# Patient Record
Sex: MALE | Race: OTHER
[De-identification: names, ages, dates, MRNs, and addresses within clinical notes are randomized per-mention and may not be internally consistent; named-entity substitution may affect disease eponyms.]

---

## 2021-09-27 PROBLEM — Z00.00 ENCOUNTER FOR PREVENTIVE HEALTH EXAMINATION: Status: ACTIVE | Noted: 2021-09-27

## 2021-11-30 ENCOUNTER — APPOINTMENT (OUTPATIENT)
Dept: ENDOCRINOLOGY | Facility: CLINIC | Age: 51
End: 2021-11-30
Payer: COMMERCIAL

## 2021-11-30 VITALS
OXYGEN SATURATION: 98 % | WEIGHT: 160 LBS | TEMPERATURE: 97.5 F | BODY MASS INDEX: 24.25 KG/M2 | DIASTOLIC BLOOD PRESSURE: 82 MMHG | HEIGHT: 68 IN | HEART RATE: 102 BPM | SYSTOLIC BLOOD PRESSURE: 152 MMHG

## 2021-11-30 DIAGNOSIS — Z82.5 FAMILY HISTORY OF ASTHMA AND OTHER CHRONIC LOWER RESPIRATORY DISEASES: ICD-10-CM

## 2021-11-30 DIAGNOSIS — Z83.3 FAMILY HISTORY OF DIABETES MELLITUS: ICD-10-CM

## 2021-11-30 DIAGNOSIS — Z78.9 OTHER SPECIFIED HEALTH STATUS: ICD-10-CM

## 2021-11-30 DIAGNOSIS — Z87.891 PERSONAL HISTORY OF NICOTINE DEPENDENCE: ICD-10-CM

## 2021-11-30 PROCEDURE — 99204 OFFICE O/P NEW MOD 45 MIN: CPT

## 2021-11-30 RX ORDER — LISINOPRIL 10 MG/1
10 TABLET ORAL
Refills: 0 | Status: ACTIVE | COMMUNITY

## 2021-11-30 RX ORDER — ASPIRIN 81 MG
81 TABLET, DELAYED RELEASE (ENTERIC COATED) ORAL
Refills: 0 | Status: ACTIVE | COMMUNITY

## 2021-11-30 RX ORDER — INSULIN GLARGINE 100 [IU]/ML
100 INJECTION, SOLUTION SUBCUTANEOUS
Refills: 0 | Status: ACTIVE | COMMUNITY

## 2021-11-30 RX ORDER — METFORMIN HYDROCHLORIDE 1000 MG/1
1000 TABLET, COATED ORAL
Refills: 0 | Status: ACTIVE | COMMUNITY

## 2021-11-30 RX ORDER — SIMVASTATIN 40 MG/1
40 TABLET, FILM COATED ORAL
Refills: 0 | Status: ACTIVE | COMMUNITY

## 2021-11-30 NOTE — REASON FOR VISIT
[Initial Evaluation] : an initial evaluation [DM Type 2] : DM Type 2 [Weight Management/Obesity] : weight management/obesity [FreeTextEntry2] : Dr Haq

## 2021-11-30 NOTE — HISTORY OF PRESENT ILLNESS
[FreeTextEntry1] : Mr. GALE BIGGS  Is  a 51 year  old male  who presented for evaluation of type 2 Diabetes:\par \par \par Diagnosis: around 10 years ago \par Current Regimen:  Lantus 50 units Bedtime, metformin 1000 mg BID, januvia 50 mg daily ( added in 9/2021) \par Previous regimens: only above \par Compliance: good \par SMBG/CGM :  am fasting 110-220 \par Hypoglycemia: none \par Polyuria/polydipsia : no \par Weight change/BMI: lost\par Diet: sometimes skip lunch \par Exercise: not often \par HBa1c trend: 11.3% ( 9/2021) \par \par \par .prevention  \par Statin: simvastatin 40 mg \par ACE/ARB :lisinopril 10 mg daily \par Eye examination: no retinopathy \par Neuropathy: no , no podiatry \par NO CAD , no STROKE\par + FH of type 1 DM

## 2021-11-30 NOTE — CONSULT LETTER
[Dear  ___] : Dear  [unfilled], [( Thank you for referring [unfilled] for consultation for _____ )] : Thank you for referring [unfilled] for consultation for [unfilled] [Please see my note below.] : Please see my note below. [Consult Closing:] : Thank you very much for allowing me to participate in the care of this patient.  If you have any questions, please do not hesitate to contact me. [Sincerely,] : Sincerely, [FreeTextEntry3] : Marlena Morgan MD

## 2021-11-30 NOTE — REVIEW OF SYSTEMS
[Fatigue] : no fatigue [Recent Weight Gain (___ Lbs)] : no recent weight gain [Blurred Vision] : no blurred vision [Dysphagia] : no dysphagia [Neck Pain] : no neck pain [Chest Pain] : no chest pain [Palpitations] : no palpitations [Fast Heart Rate] : heart rate is not fast [Lower Ext Edema] : no lower extremity edema [Shortness Of Breath] : no shortness of breath [Wheezing] : no wheezing [SOB on Exertion] : no shortness of breath on exertion [Nausea] : no nausea [Constipation] : no constipation [Vomiting] : no vomiting [Diarrhea] : no diarrhea [Headaches] : no headaches [Dizziness] : no dizziness [Tremors] : no tremors [Pain/Numbness of Digits] : no pain/numbness of digits [Cold Intolerance] : no cold intolerance [Heat Intolerance] : no heat intolerance

## 2021-11-30 NOTE — PHYSICAL EXAM
[Alert] : alert [Well Nourished] : well nourished [Healthy Appearance] : healthy appearance [No Acute Distress] : no acute distress [No Proptosis] : no proptosis [No Lid Lag] : no lid lag [Thyroid Not Enlarged] : the thyroid was not enlarged [No Thyroid Nodules] : no palpable thyroid nodules [No Respiratory Distress] : no respiratory distress [No Accessory Muscle Use] : no accessory muscle use [Clear to Auscultation] : lungs were clear to auscultation bilaterally [Normal S1, S2] : normal S1 and S2 [No Murmurs] : no murmurs [Regular Rhythm] : with a regular rhythm [No Edema] : no peripheral edema [No Stigmata of Cushings Syndrome] : no stigmata of Cushings Syndrome [Acanthosis Nigricans] : acanthosis nigricans present [No Tremors] : no tremors [Abdominal Striae] : no abdominal striae

## 2021-11-30 NOTE — ASSESSMENT
[Diabetes Foot Care] : diabetes foot care [Carbohydrate Consistent Diet] : carbohydrate consistent diet [Importance of Diet and Exercise] : importance of diet and exercise to improve glycemic control, achieve weight loss and improve cardiovascular health [Exercise/Effect on Glucose] : exercise/effect on glucose [Hypoglycemia Management] : hypoglycemia management [Self Monitoring of Blood Glucose] : self monitoring of blood glucose [Retinopathy Screening] : Patient was referred to ophthalmology for retinopathy screening [Weight Loss] : weight loss [FreeTextEntry1] : Mr. GALE BIGGS  Is  a 51 year  old male  who presented for evaluation of type 2 Diabetes:\par \par #uncontrolled type 2 DM : \par - A1c 11.3% , SMBG with above target numbers \par - COntinue Lanstu 50 units daily , metformin 1000 mg BID \par - STOP januvia \par - start Trulicity 0.75 mg Q week, reviewed SE and benefits \par - will check antibodies and C peptid , + FH of type 1 ? [place] \par - COntinue statin and ACE inhibitors for now \par - following with opthalmology \par - podiatry and DM educator referral

## 2021-12-15 ENCOUNTER — TRANSCRIPTION ENCOUNTER (OUTPATIENT)
Age: 51
End: 2021-12-15

## 2022-02-08 ENCOUNTER — APPOINTMENT (OUTPATIENT)
Dept: ENDOCRINOLOGY | Facility: CLINIC | Age: 52
End: 2022-02-08
Payer: COMMERCIAL

## 2022-02-08 VITALS
BODY MASS INDEX: 24.1 KG/M2 | WEIGHT: 159 LBS | OXYGEN SATURATION: 98 % | TEMPERATURE: 97.4 F | HEIGHT: 68 IN | DIASTOLIC BLOOD PRESSURE: 80 MMHG | HEART RATE: 90 BPM | SYSTOLIC BLOOD PRESSURE: 124 MMHG

## 2022-02-08 DIAGNOSIS — E10.65 TYPE 1 DIABETES MELLITUS WITH HYPERGLYCEMIA: ICD-10-CM

## 2022-02-08 DIAGNOSIS — E11.65 TYPE 2 DIABETES MELLITUS WITH HYPERGLYCEMIA: ICD-10-CM

## 2022-02-08 PROCEDURE — 99214 OFFICE O/P EST MOD 30 MIN: CPT

## 2022-02-08 RX ORDER — FLASH GLUCOSE SCANNING READER
EACH MISCELLANEOUS
Qty: 1 | Refills: 0 | Status: ACTIVE | COMMUNITY
Start: 2022-02-08 | End: 1900-01-01

## 2022-02-08 NOTE — CONSULT LETTER
[Dear  ___] : Dear  [unfilled], [Courtesy Letter:] : I had the pleasure of seeing your patient, [unfilled], in my office today. [Please see my note below.] : Please see my note below. [Referral Closing:] : Thank you very much for seeing this patient.  If you have any questions, please do not hesitate to contact me. [Sincerely,] : Sincerely, [FreeTextEntry3] : Marlena Morgan

## 2022-02-08 NOTE — PHYSICAL EXAM
[Alert] : alert [Well Nourished] : well nourished [Healthy Appearance] : healthy appearance [No Acute Distress] : no acute distress [No Proptosis] : no proptosis [No Lid Lag] : no lid lag [Thyroid Not Enlarged] : the thyroid was not enlarged [No Thyroid Nodules] : no palpable thyroid nodules [No Respiratory Distress] : no respiratory distress [No Accessory Muscle Use] : no accessory muscle use [No Murmurs] : no murmurs [Regular Rhythm] : with a regular rhythm [No Edema] : no peripheral edema [No Stigmata of Cushings Syndrome] : no stigmata of Cushings Syndrome [Acanthosis Nigricans] : acanthosis nigricans present [No Tremors] : no tremors [Abdominal Striae] : no abdominal striae

## 2022-02-08 NOTE — DATA REVIEWED
[FreeTextEntry1] : 9/2021: A1c 11.3% glucose 178 crea 0.72 AST 13 ALT 13    \par 2/1/22: A1c 11.8% glucose 169 DON antibodies positive (30)  c peptid  LDL 94 crea 0.79  TSH 2.72\par

## 2022-02-08 NOTE — HISTORY OF PRESENT ILLNESS
[FreeTextEntry1] : Mr. GALE BIGGS  Is  a 51 year  old male (female )   who present for follow up Type 1 DM /[place] \par \par \par Diagnosis: around 10 years ago with type 2 Diabetes , antibodies checked now and is positive ([place]) \par Current Regimen:  Lantus 50 units Bedtime, metformin 1000 mg BID, trulicity 0.75 mg Q week  \par Previous regimens: only above \par Compliance: good \par SMBG/CGM :  noticed improved numbers on trulicity but not at target \par Hypoglycemia: none \par Polyuria/polydipsia : no \par Weight change/BMI: lost\par Diet: sometimes skip lunch \par Exercise: not often \par HBa1c trend: 11.3% ( 9/2021) ..11.8% (2/2022) \par DON positive \par \par \par .prevention  \par Statin: simvastatin 40 mg \par ACE/ARB :lisinopril 10 mg daily \par Eye examination: no retinopathy \par Neuropathy: no , no podiatry \par NO CAD , no STROKE\par + FH of type 1 DM

## 2022-02-08 NOTE — ASSESSMENT
[Diabetes Foot Care] : diabetes foot care [Carbohydrate Consistent Diet] : carbohydrate consistent diet [Importance of Diet and Exercise] : importance of diet and exercise to improve glycemic control, achieve weight loss and improve cardiovascular health [Exercise/Effect on Glucose] : exercise/effect on glucose [Hypoglycemia Management] : hypoglycemia management [Self Monitoring of Blood Glucose] : self monitoring of blood glucose [Retinopathy Screening] : Patient was referred to ophthalmology for retinopathy screening [Weight Loss] : weight loss [FreeTextEntry1] : GALE BIGGS  Is  a 51 year  old male (female) who presented for follow up, found to have positive antibodies for DON and now diagnosed as [place] / type 1 DM \par \par #uncontrolled type I DM/ [place]  \par - A1c 11.3% , was managed as type 2 DM , now antibodies positive very low c peptid indicating [place] /type 1 DM \par - STOP trulicity \par - Stop metformin \par - Continue Lantus 50 units daily\par - start Admelog 6 units TIDAC + ISS 2:50 \par - will need CGM as will be checking FS 4-5 times/day and adjusting insulin doses ( 4 injections/day )  based on FS \par - reviewed freestyle sruthi use and insertion \par - refer to DM educator to learn carb counting and correction \par - COntinue statin and ACE inhibitors for now \par - following with opthalmology \par

## 2022-02-08 NOTE — REASON FOR VISIT
[Follow - Up] : a follow-up visit [Weight Management/Obesity] : weight management/obesity [DM Type 1] : DM Type 1 [FreeTextEntry2] : Dr Haq

## 2022-02-22 ENCOUNTER — OUTPATIENT (OUTPATIENT)
Dept: OUTPATIENT SERVICES | Facility: HOSPITAL | Age: 52
LOS: 1 days | Discharge: HOME | End: 2022-02-22

## 2022-02-22 ENCOUNTER — NON-APPOINTMENT (OUTPATIENT)
Age: 52
End: 2022-02-22

## 2022-02-22 ENCOUNTER — APPOINTMENT (OUTPATIENT)
Dept: NUTRITION | Facility: CLINIC | Age: 52
End: 2022-02-22

## 2022-03-08 DIAGNOSIS — E10.9 TYPE 1 DIABETES MELLITUS WITHOUT COMPLICATIONS: ICD-10-CM

## 2022-03-21 ENCOUNTER — NON-APPOINTMENT (OUTPATIENT)
Age: 52
End: 2022-03-21

## 2022-03-22 ENCOUNTER — APPOINTMENT (OUTPATIENT)
Dept: NUTRITION | Facility: CLINIC | Age: 52
End: 2022-03-22

## 2022-04-19 ENCOUNTER — OUTPATIENT (OUTPATIENT)
Dept: OUTPATIENT SERVICES | Facility: HOSPITAL | Age: 52
LOS: 1 days | Discharge: HOME | End: 2022-04-19

## 2022-04-19 ENCOUNTER — NON-APPOINTMENT (OUTPATIENT)
Age: 52
End: 2022-04-19

## 2022-04-19 ENCOUNTER — APPOINTMENT (OUTPATIENT)
Dept: NUTRITION | Facility: CLINIC | Age: 52
End: 2022-04-19

## 2022-04-27 DIAGNOSIS — E10.9 TYPE 1 DIABETES MELLITUS WITHOUT COMPLICATIONS: ICD-10-CM

## 2022-05-09 ENCOUNTER — APPOINTMENT (OUTPATIENT)
Dept: ENDOCRINOLOGY | Facility: CLINIC | Age: 52
End: 2022-05-09
Payer: COMMERCIAL

## 2022-05-09 VITALS
BODY MASS INDEX: 26.37 KG/M2 | HEIGHT: 68 IN | OXYGEN SATURATION: 98 % | SYSTOLIC BLOOD PRESSURE: 124 MMHG | HEART RATE: 80 BPM | TEMPERATURE: 97.2 F | WEIGHT: 174 LBS | DIASTOLIC BLOOD PRESSURE: 78 MMHG

## 2022-05-09 PROCEDURE — 99214 OFFICE O/P EST MOD 30 MIN: CPT

## 2022-05-09 RX ORDER — INSULIN LISPRO 100 U/ML
100 INJECTION, SOLUTION SUBCUTANEOUS
Qty: 1 | Refills: 3 | Status: DISCONTINUED | COMMUNITY
Start: 2022-02-08 | End: 2022-05-09

## 2022-05-09 NOTE — REASON FOR VISIT
[Follow - Up] : a follow-up visit [DM Type 1] : DM Type 1 [Weight Management/Obesity] : weight management/obesity [FreeTextEntry2] : Dr Haq

## 2022-05-09 NOTE — ASSESSMENT
[Diabetes Foot Care] : diabetes foot care [Carbohydrate Consistent Diet] : carbohydrate consistent diet [Importance of Diet and Exercise] : importance of diet and exercise to improve glycemic control, achieve weight loss and improve cardiovascular health [Exercise/Effect on Glucose] : exercise/effect on glucose [Hypoglycemia Management] : hypoglycemia management [Self Monitoring of Blood Glucose] : self monitoring of blood glucose [Retinopathy Screening] : Patient was referred to ophthalmology for retinopathy screening [Weight Loss] : weight loss [FreeTextEntry1] : GALE BIGGS  Is  a 51 year  old male (female) who presented for follow up, found to have positive antibodies for DON and now diagnosed as [place] / type 1 DM with features of type 2 DM \par \par #uncontrolled type I DM/ [place]  \par - A1c 11.3% now down to 9.3%  , was managed as type 2 DM , now antibodies positive very low c peptid indicating [place] /type 1 DM \par - CGM reviewed showing hyperglycemia , , rare hypoglycemia always lunch time (2-4 pm ) related to taking insulin and not eating . advised can wait until eating to take insulin \par  - increase Semglee to 60 units , and Humalog to 10+ ISS 2:50 > 150 \par - restart metformin 1000 mg BID , given weight gain , if no improvement will restart  trulicity too to help with weight as patient has also features of type 2 Dm \par - using FreeStyle sruthi  CGM as will be checking FS 4-5 times/day and adjusting insulin doses ( 4 injections/day )  based on FS \par - COntinue statin and ACE inhibitors for now \par - following with opthalmology and podiatry \par \par

## 2022-05-09 NOTE — REVIEW OF SYSTEMS
[Recent Weight Gain (___ Lbs)] : recent weight gain: [unfilled] lbs [Fatigue] : no fatigue [Blurred Vision] : no blurred vision [Dysphagia] : no dysphagia [Neck Pain] : no neck pain [Chest Pain] : no chest pain [Palpitations] : no palpitations [Fast Heart Rate] : heart rate is not fast [Lower Ext Edema] : no lower extremity edema [Shortness Of Breath] : no shortness of breath [Wheezing] : no wheezing [SOB on Exertion] : no shortness of breath on exertion [Nausea] : no nausea [Constipation] : no constipation [Vomiting] : no vomiting [Diarrhea] : no diarrhea [Headaches] : no headaches [Dizziness] : no dizziness [Tremors] : no tremors [Pain/Numbness of Digits] : no pain/numbness of digits [Cold Intolerance] : no cold intolerance [Heat Intolerance] : no heat intolerance

## 2022-05-09 NOTE — HISTORY OF PRESENT ILLNESS
[George] : George [Hypoglycemia] : Patient is hypoglycemic. [FreeTextEntry1] : Mr. GALE BIGGS  Is  a 51 year  old male (female )   who present for follow up Type 1 DM /[place] \par \par \par Diagnosis: around 10 years ago with type 2 Diabetes , antibodies checked now and is positive ([place]) has also features of type 2 DM \par Current Regimen:  Semglee 50 units daily at bedtime, Humalog 6 + ISs 2:50 TIDAC \par Previous regimens: metformin and Trulicity \par Compliance: good \par SMBG/CGM : using FreeStyle sruthi  noticed improved numbers but still high \par Hypoglycemia: few episodes related to taking insulin and not eating on time \par Polyuria/polydipsia : no \par Weight change/BMI: gained since started insulin \par Diet: sometimes skip lunch \par Exercise: not often \par HBa1c trend: 11.3% ( 9/2021) ..11.8% (2/2022)...9.3% ( 5/2022) \par DON positive \par \par \par .prevention  \par Statin: simvastatin 40 mg \par ACE/ARB :lisinopril 10 mg daily \par Eye examination: no retinopathy \par Neuropathy: no , no podiatry \par NO CAD , no STROKE\par + FH of type 1 DM   [FreeTextEntry2] : 18 [FreeTextEntry3] : 30 very high 51 [FreeTextEntry4] : 1%

## 2022-05-09 NOTE — DATA REVIEWED
[FreeTextEntry1] : 9/2021: A1c 11.3% glucose 178 crea 0.72 AST 13 ALT 13    \par 2/1/22: A1c 11.8% glucose 169 DON antibodies positive (30)  c peptid  LDL 94 crea 0.79  TSH 2.72\par 5/3/22: A1c 9.3% glucose 261 LDL 89 TG 89 crea 0.84   AST 14  ALT 17  TSH 2.74  hb 15.1

## 2022-05-27 ENCOUNTER — RX RENEWAL (OUTPATIENT)
Age: 52
End: 2022-05-27

## 2022-07-05 ENCOUNTER — APPOINTMENT (OUTPATIENT)
Dept: NUTRITION | Facility: CLINIC | Age: 52
End: 2022-07-05

## 2022-08-08 ENCOUNTER — APPOINTMENT (OUTPATIENT)
Dept: ENDOCRINOLOGY | Facility: CLINIC | Age: 52
End: 2022-08-08

## 2022-08-08 VITALS
DIASTOLIC BLOOD PRESSURE: 78 MMHG | TEMPERATURE: 97.2 F | BODY MASS INDEX: 26.83 KG/M2 | HEIGHT: 68 IN | HEART RATE: 82 BPM | OXYGEN SATURATION: 98 % | SYSTOLIC BLOOD PRESSURE: 122 MMHG | WEIGHT: 177 LBS

## 2022-08-08 PROCEDURE — 99214 OFFICE O/P EST MOD 30 MIN: CPT

## 2022-08-08 NOTE — ASSESSMENT
[Diabetes Foot Care] : diabetes foot care [Carbohydrate Consistent Diet] : carbohydrate consistent diet [Importance of Diet and Exercise] : importance of diet and exercise to improve glycemic control, achieve weight loss and improve cardiovascular health [Exercise/Effect on Glucose] : exercise/effect on glucose [Hypoglycemia Management] : hypoglycemia management [Self Monitoring of Blood Glucose] : self monitoring of blood glucose [Retinopathy Screening] : Patient was referred to ophthalmology for retinopathy screening [Weight Loss] : weight loss [FreeTextEntry1] : GALE BIGGS  Is  a 52 year  old male (female) who presented for follow up, found to have positive antibodies for DON and now diagnosed as [place] / type 1 DM with features of type 2 DM \par \par #uncontrolled type I DM/ [place]  \par - A1c 11.3% now down to 7.3%   , was managed as type 2 DM , now antibodies positive very low c peptid indicating [place] /type 1 DM \par - CGM reviewed showing hyperglycemia more over past 2 weeks as was on vacation \par  - continue Semglee  60 units at bedtime and add am dose 10 units  , and Humalog to 10+ ISS 2:50 > 150 with breakfast and lunch and 12+ iSS with dinner \par - continue metformin 1000 mg BID , given weight gain , and restart  trulicity too to help with weight as patient has also features of type 2 Dm \par - using FreeStyle sruthi  CGM as will be checking FS 4-5 times/day and adjusting insulin doses ( 4 injections/day )  based on FS \par - COntinue statin and ACE inhibitors for now \par - following with opthalmology and podiatry \par - b12 low start OTC \par

## 2022-08-08 NOTE — HISTORY OF PRESENT ILLNESS
[George] : George [Hypoglycemia] : Patient is hypoglycemic. [FreeTextEntry1] : Mr. GALE BIGGS  Is  a 52 year  old male (female )   who present for follow up Type 1 DM /[place] with insulin resistance \par \par \par Diagnosis: around 10 years ago with type 2 Diabetes , antibodies checked now and is positive ([place]) has also features of type 2 DM \par Current Regimen:  Semglee 60 units daily at bedtime, Humalog 10 + ISs 2:50 TIDAC , metformin 1000 mg BID \par Previous regimens:  Trulicity \par Compliance: good \par SMBG/CGM : using FreeStyle sruthi  noticed improved numbers but still high \par Hypoglycemia: only one\par Polyuria/polydipsia : no \par Weight change/BMI: gained since started insulin\par Diet: sometimes skip lunch \par Exercise: not often \par HBa1c trend: 11.3% ( 9/2021) ..11.8% (2/2022)...9.3% ( 5/2022) ...7.6% ( 8/2022) \par DON positive \par \par \par .prevention  \par Statin: simvastatin 40 mg \par ACE/ARB :lisinopril 10 mg daily \par Eye examination: no retinopathy \par Neuropathy: no , no podiatry \par NO CAD , no STROKE\par + FH of type 1 DM   [FreeTextEntry3] : very high 51

## 2022-08-08 NOTE — DATA REVIEWED
[FreeTextEntry1] : 9/2021: A1c 11.3% glucose 178 crea 0.72 AST 13 ALT 13    \par 2/1/22: A1c 11.8% glucose 169 DON antibodies positive (30)  c peptid  LDL 94 crea 0.79  TSH 2.72\par 5/3/22: A1c 9.3% glucose 261 LDL 89 TG 89 crea 0.84   AST 14  ALT 17  TSH 2.74  hb 15.1 \par 8/1/22: A1c 7.6%  glucose 153   Crea 0.96  GFR 95 LDL 92   TSH 3.4 b12 280

## 2022-11-17 ENCOUNTER — RX RENEWAL (OUTPATIENT)
Age: 52
End: 2022-11-17

## 2023-01-08 ENCOUNTER — RX RENEWAL (OUTPATIENT)
Age: 53
End: 2023-01-08

## 2023-03-01 ENCOUNTER — RX RENEWAL (OUTPATIENT)
Age: 53
End: 2023-03-01

## 2023-03-02 ENCOUNTER — APPOINTMENT (OUTPATIENT)
Dept: ENDOCRINOLOGY | Facility: CLINIC | Age: 53
End: 2023-03-02
Payer: COMMERCIAL

## 2023-03-02 VITALS
OXYGEN SATURATION: 98 % | WEIGHT: 185 LBS | DIASTOLIC BLOOD PRESSURE: 82 MMHG | SYSTOLIC BLOOD PRESSURE: 150 MMHG | BODY MASS INDEX: 28.04 KG/M2 | HEIGHT: 68 IN | HEART RATE: 85 BPM | TEMPERATURE: 97.4 F

## 2023-03-02 PROCEDURE — 99214 OFFICE O/P EST MOD 30 MIN: CPT

## 2023-03-02 RX ORDER — DULAGLUTIDE 0.75 MG/.5ML
0.75 INJECTION, SOLUTION SUBCUTANEOUS
Qty: 3 | Refills: 2 | Status: DISCONTINUED | COMMUNITY
Start: 2022-08-08 | End: 2023-03-02

## 2023-03-02 RX ORDER — DULAGLUTIDE 0.75 MG/.5ML
0.75 INJECTION, SOLUTION SUBCUTANEOUS
Qty: 3 | Refills: 1 | Status: DISCONTINUED | COMMUNITY
Start: 2021-11-30 | End: 2023-03-02

## 2023-03-02 RX ORDER — INSULIN GLARGINE 100 [IU]/ML
100 INJECTION, SOLUTION SUBCUTANEOUS DAILY
Qty: 4 | Refills: 1 | Status: DISCONTINUED | COMMUNITY
Start: 2022-03-28 | End: 2023-03-02

## 2023-03-02 NOTE — ASSESSMENT
[Diabetes Foot Care] : diabetes foot care [Carbohydrate Consistent Diet] : carbohydrate consistent diet [Importance of Diet and Exercise] : importance of diet and exercise to improve glycemic control, achieve weight loss and improve cardiovascular health [Exercise/Effect on Glucose] : exercise/effect on glucose [Hypoglycemia Management] : hypoglycemia management [Self Monitoring of Blood Glucose] : self monitoring of blood glucose [Retinopathy Screening] : Patient was referred to ophthalmology for retinopathy screening [Weight Loss] : weight loss [FreeTextEntry1] : GALE BIGGS  Is  a 52 year  old patient  who presented for follow up, found to have positive antibodies for DON and  diagnosed as [place] / type 1 DM with features of type 2 DM \par \par #uncontrolled type I DM/ [place]  with insulin resistance \par - A1c now down to 7.7%   , was managed as type 2 DM , now antibodies positive very low c peptid indicating [place] /type 1 DM \par - CGM reviewed showing hyperglycemia mostly night time  and post meals \par - will switch trulicity to MOUNJARO 5 mg Q week , as patient as inuslin resistance too and not loosing weight  \par  - continue Semglee  60 units at bedtime and   increase  Humalog to 12 + ISS 2: 50 > 120 with breakfast and lunch and  dinner \par - if no improvement with MOunjaro will add Semlgee in am 10-20 units \par - continue metformin 1000 mg BID\par - using FreeStyle sruthi  CGM as will be checking FS 4-5 times/day and adjusting insulin doses ( 4 injections/day )  based on FS \par - COntinue statin and ACE inhibitors for now , BP rechecked 135/80 \par - following with opthalmology and podiatry \par - b12 low was taking 5000 mg daily now high , stop and will monitor \par \par # vit d deficiency : start OTC 1000 IU daily \par

## 2023-03-02 NOTE — DATA REVIEWED
[FreeTextEntry1] : 9/2021: A1c 11.3% glucose 178 crea 0.72 AST 13 ALT 13    \par 2/1/22: A1c 11.8% glucose 169 DON antibodies positive (30)  c peptid  LDL 94 crea 0.79  TSH 2.72\par 5/3/22: A1c 9.3% glucose 261 LDL 89 TG 89 crea 0.84   AST 14  ALT 17  TSH 2.74  hb 15.1 \par 8/1/22: A1c 7.6%  glucose 153   Crea 0.96  GFR 95 LDL 92   TSH 3.4 b12 280 \par 2/2023: A1c 7.7%   glucose 110    crea 0.85   K 5.4  ACR negative   TSH 3.16  b12 > 2000

## 2023-03-02 NOTE — HISTORY OF PRESENT ILLNESS
[George] : George [Hypoglycemia] : Patient is hypoglycemic. [FreeTextEntry1] : Mr. GALE BIGGS  Is  a 52 year  old male (female )   who present for follow up Type 1 DM /[place] with insulin resistance \par \par \par Diagnosis: around 10 years ago with type 2 Diabetes , antibodies checked nand is positive ([place]) has also features of type 2 DM \par Current Regimen:  Semglee 60 units daily at bedtime, Humalog 10 + ISS 2:50 TIDAC , metformin 1000 mg BID , trulicity 0.75 mg Q week \par Previous regimens:  as above \par Compliance: good \par SMBG/CGM : using FreeStyle sruthi  2 , remain higher\par Hypoglycemia: 1%\par Polyuria/polydipsia : no \par Weight change/BMI: gained since started insulin\par Diet: sometimes skip lunch \par Exercise: not often \par HBa1c trend: 11.3% ( 9/2021) ..11.8% (2/2022)...9.3% ( 5/2022) ...7.6% ( 8/2022) ...7.7% ( 2/2023) \par DON positive \par \par \par .prevention  \par Statin: simvastatin 40 mg \par ACE/ARB :lisinopril 10 mg daily \par Eye examination: no retinopathy \par Neuropathy: no , no podiatry \par NO CAD , no STROKE\par + FH of type 1 DM

## 2023-03-02 NOTE — PHYSICAL EXAM
[Alert] : alert [Well Nourished] : well nourished [Healthy Appearance] : healthy appearance [No Acute Distress] : no acute distress [No Proptosis] : no proptosis [No Lid Lag] : no lid lag [Thyroid Not Enlarged] : the thyroid was not enlarged [No Thyroid Nodules] : no palpable thyroid nodules [No Respiratory Distress] : no respiratory distress [No Accessory Muscle Use] : no accessory muscle use [No Murmurs] : no murmurs [Regular Rhythm] : with a regular rhythm [No Edema] : no peripheral edema [No Stigmata of Cushings Syndrome] : no stigmata of Cushings Syndrome [Acanthosis Nigricans] : acanthosis nigricans present [No Tremors] : no tremors [Abdominal Striae] : no abdominal striae [Oriented x3] : oriented to person, place, and time

## 2023-06-16 ENCOUNTER — RX RENEWAL (OUTPATIENT)
Age: 53
End: 2023-06-16

## 2023-07-20 ENCOUNTER — APPOINTMENT (OUTPATIENT)
Dept: ENDOCRINOLOGY | Facility: CLINIC | Age: 53
End: 2023-07-20

## 2023-09-13 ENCOUNTER — APPOINTMENT (OUTPATIENT)
Dept: ENDOCRINOLOGY | Facility: CLINIC | Age: 53
End: 2023-09-13
Payer: COMMERCIAL

## 2023-09-13 VITALS
DIASTOLIC BLOOD PRESSURE: 70 MMHG | WEIGHT: 177 LBS | OXYGEN SATURATION: 98 % | BODY MASS INDEX: 26.83 KG/M2 | SYSTOLIC BLOOD PRESSURE: 130 MMHG | HEART RATE: 80 BPM | HEIGHT: 68 IN

## 2023-09-13 DIAGNOSIS — E16.2 HYPOGLYCEMIA, UNSPECIFIED: ICD-10-CM

## 2023-09-13 DIAGNOSIS — E66.9 OBESITY, UNSPECIFIED: ICD-10-CM

## 2023-09-13 PROCEDURE — 99214 OFFICE O/P EST MOD 30 MIN: CPT

## 2023-09-13 RX ORDER — GLUCAGON 3 MG/1
3 POWDER NASAL
Qty: 1 | Refills: 3 | Status: ACTIVE | COMMUNITY
Start: 2023-09-13 | End: 1900-01-01

## 2023-09-13 RX ORDER — SITAGLIPTIN 50 MG/1
50 TABLET, FILM COATED ORAL
Refills: 0 | Status: DISCONTINUED | COMMUNITY
End: 2023-09-13

## 2023-09-14 ENCOUNTER — NON-APPOINTMENT (OUTPATIENT)
Age: 53
End: 2023-09-14

## 2024-03-13 ENCOUNTER — APPOINTMENT (OUTPATIENT)
Dept: ENDOCRINOLOGY | Facility: CLINIC | Age: 54
End: 2024-03-13
Payer: COMMERCIAL

## 2024-03-13 VITALS
HEIGHT: 68 IN | BODY MASS INDEX: 26.83 KG/M2 | DIASTOLIC BLOOD PRESSURE: 80 MMHG | WEIGHT: 177 LBS | SYSTOLIC BLOOD PRESSURE: 130 MMHG | OXYGEN SATURATION: 98 % | HEART RATE: 77 BPM

## 2024-03-13 DIAGNOSIS — R79.89 OTHER SPECIFIED ABNORMAL FINDINGS OF BLOOD CHEMISTRY: ICD-10-CM

## 2024-03-13 DIAGNOSIS — E13.9 OTHER SPECIFIED DIABETES MELLITUS W/OUT COMPLICATIONS: ICD-10-CM

## 2024-03-13 DIAGNOSIS — E78.5 HYPERLIPIDEMIA, UNSPECIFIED: ICD-10-CM

## 2024-03-13 PROCEDURE — 99214 OFFICE O/P EST MOD 30 MIN: CPT

## 2024-03-13 RX ORDER — PEN NEEDLE, DIABETIC 29 G X1/2"
31G X 5 MM NEEDLE, DISPOSABLE MISCELLANEOUS
Qty: 6 | Refills: 1 | Status: ACTIVE | COMMUNITY
Start: 2022-02-08 | End: 1900-01-01

## 2024-03-13 RX ORDER — TIRZEPATIDE 5 MG/.5ML
5 INJECTION, SOLUTION SUBCUTANEOUS
Qty: 3 | Refills: 1 | Status: ACTIVE | COMMUNITY
Start: 2024-03-13 | End: 1900-01-01

## 2024-03-13 RX ORDER — FLASH GLUCOSE SENSOR
KIT MISCELLANEOUS
Qty: 6 | Refills: 1 | Status: ACTIVE | COMMUNITY
Start: 2022-02-08 | End: 1900-01-01

## 2024-03-13 RX ORDER — METFORMIN HYDROCHLORIDE 1000 MG/1
1000 TABLET, COATED ORAL
Qty: 180 | Refills: 1 | Status: ACTIVE | COMMUNITY
Start: 2022-05-09 | End: 1900-01-01

## 2024-03-13 RX ORDER — DULAGLUTIDE 1.5 MG/.5ML
1.5 INJECTION, SOLUTION SUBCUTANEOUS
Qty: 3 | Refills: 1 | Status: DISCONTINUED | COMMUNITY
Start: 2023-03-02 | End: 2024-03-13

## 2024-03-13 RX ORDER — INSULIN LISPRO 100 [IU]/ML
100 INJECTION, SOLUTION INTRAVENOUS; SUBCUTANEOUS
Qty: 90 | Refills: 1 | Status: ACTIVE | COMMUNITY
Start: 2022-02-10 | End: 1900-01-01

## 2024-03-13 RX ORDER — INSULIN GLARGINE-YFGN 100 [IU]/ML
100 INJECTION, SOLUTION SUBCUTANEOUS
Qty: 6 | Refills: 3 | Status: DISCONTINUED | COMMUNITY
Start: 2023-03-02 | End: 2024-03-13

## 2024-03-13 RX ORDER — TIRZEPATIDE 5 MG/.5ML
5 INJECTION, SOLUTION SUBCUTANEOUS
Qty: 3 | Refills: 1 | Status: DISCONTINUED | COMMUNITY
Start: 2023-03-02 | End: 2024-03-13

## 2024-03-13 RX ORDER — INSULIN GLARGINE-YFGN 100 [IU]/ML
100 INJECTION, SOLUTION SUBCUTANEOUS DAILY
Qty: 4 | Refills: 2 | Status: ACTIVE | COMMUNITY
Start: 2022-11-25 | End: 1900-01-01

## 2024-03-13 NOTE — PHYSICAL EXAM
[Alert] : alert [Well Nourished] : well nourished [Healthy Appearance] : healthy appearance [No Acute Distress] : no acute distress [No Proptosis] : no proptosis [No Lid Lag] : no lid lag [Thyroid Not Enlarged] : the thyroid was not enlarged [No Thyroid Nodules] : no palpable thyroid nodules [No Respiratory Distress] : no respiratory distress [No Accessory Muscle Use] : no accessory muscle use [No Murmurs] : no murmurs [Regular Rhythm] : with a regular rhythm [No Edema] : no peripheral edema [No Stigmata of Cushings Syndrome] : no stigmata of Cushings Syndrome [Acanthosis Nigricans] : acanthosis nigricans present [No Tremors] : no tremors [Oriented x3] : oriented to person, place, and time

## 2024-03-13 NOTE — ASSESSMENT
[Diabetes Foot Care] : diabetes foot care [Carbohydrate Consistent Diet] : carbohydrate consistent diet [Exercise/Effect on Glucose] : exercise/effect on glucose [Importance of Diet and Exercise] : importance of diet and exercise to improve glycemic control, achieve weight loss and improve cardiovascular health [Hypoglycemia Management] : hypoglycemia management [Self Monitoring of Blood Glucose] : self monitoring of blood glucose [Weight Loss] : weight loss [Retinopathy Screening] : Patient was referred to ophthalmology for retinopathy screening

## 2024-09-23 ENCOUNTER — APPOINTMENT (OUTPATIENT)
Dept: ENDOCRINOLOGY | Facility: CLINIC | Age: 54
End: 2024-09-23
Payer: COMMERCIAL

## 2024-09-23 VITALS
OXYGEN SATURATION: 98 % | WEIGHT: 177 LBS | DIASTOLIC BLOOD PRESSURE: 78 MMHG | HEIGHT: 68 IN | HEART RATE: 77 BPM | SYSTOLIC BLOOD PRESSURE: 130 MMHG | BODY MASS INDEX: 26.83 KG/M2

## 2024-09-23 DIAGNOSIS — E16.2 HYPOGLYCEMIA, UNSPECIFIED: ICD-10-CM

## 2024-09-23 DIAGNOSIS — E78.5 HYPERLIPIDEMIA, UNSPECIFIED: ICD-10-CM

## 2024-09-23 DIAGNOSIS — E13.9 OTHER SPECIFIED DIABETES MELLITUS W/OUT COMPLICATIONS: ICD-10-CM

## 2024-09-23 DIAGNOSIS — E66.9 OBESITY, UNSPECIFIED: ICD-10-CM

## 2024-09-23 PROCEDURE — 99214 OFFICE O/P EST MOD 30 MIN: CPT

## 2024-09-23 NOTE — HISTORY OF PRESENT ILLNESS
[George] : George [Hypoglycemia] : Patient is hypoglycemic. [FreeTextEntry1] : Mr. GLAE BIGGS  Is  a 53 year  old patient   who present for follow up Type 1 DM /[place] with insulin resistance.    Diagnosis: around 10 years ago with type 2 Diabetes , antibodies checked and is positive ([place]) has also features of type 2 DM   Current Regimen:  Semglee 50 units daily at bedtime, Humalog 14 units ( mainly with dinner and if needed other meals )  , metformin 1000 mg BID , Mounjaro 5 mg Q week Previous regimens:  as above  Compliance: good  SMBG/CGM : using Freestyle sruthi  2 , reviewed in sruthi view , 57%  at target ,les shypoglycemia .  Hypoglycemia: no  Polyuria/polydipsia : no  Weight change/BMI: lost Diet: sometimes skip lunch  Exercise: not often  HBa1c trend: 11.3% ( 9/2021) ..11.8% (2/2022)...9.3% ( 5/2022) ...7.6% ( 8/2022) ...7.7% ( 2/2023)...6.1%( 92023)...7% ( 3/2024 )...6.6%( 9/2024)     DON positive    .prevention   Statin: simvastatin 40 mg  ACE/ARB :lisinopril 10 mg daily  Eye examination: no retinopathy  Neuropathy: no , no podiatry  NO CAD , no STROKE + FH of type 1 DM

## 2024-09-23 NOTE — DATA REVIEWED
[FreeTextEntry1] : 9/2021: A1c 11.3% glucose 178 crea 0.72 AST 13 ALT 13     2/1/22: A1c 11.8% glucose 169 DON antibodies positive (30)  c peptid  LDL 94 crea 0.79  TSH 2.72 5/3/22: A1c 9.3% glucose 261 LDL 89 TG 89 crea 0.84   AST 14  ALT 17  TSH 2.74  hb 15.1  8/1/22: A1c 7.6%  glucose 153   Crea 0.96  GFR 95 LDL 92   TSH 3.4 b12 280  2/2023: A1c 7.7%   glucose 110    crea 0.85   K 5.4  ACR negative   TSH 3.16  b12 > 2000  9/2023: A1c 6% glucose LDL 79 TG 96 glucose 115 K 5.7 b12 426 25 vit D 22  2/2024: A1c 7% glucose 166  LDL 83  ACR negative crea 0.82   9/2024:A1c 6.6% glucose 143  LDL 77 Tg 135  ACR negative

## 2024-09-23 NOTE — PHYSICAL EXAM
[Alert] : alert [Well Nourished] : well nourished [Healthy Appearance] : healthy appearance [No Acute Distress] : no acute distress [No Proptosis] : no proptosis [No Lid Lag] : no lid lag [Thyroid Not Enlarged] : the thyroid was not enlarged [No Thyroid Nodules] : no palpable thyroid nodules [No Respiratory Distress] : no respiratory distress [No Accessory Muscle Use] : no accessory muscle use [No Murmurs] : no murmurs [Regular Rhythm] : with a regular rhythm [No Edema] : no peripheral edema [No Stigmata of Cushings Syndrome] : no stigmata of Cushings Syndrome [Acanthosis Nigricans] : acanthosis nigricans present [No Tremors] : no tremors [Oriented x3] : oriented to person, place, and time [Abdominal Striae] : no abdominal striae

## 2024-09-23 NOTE — REVIEW OF SYSTEMS
[Fatigue] : no fatigue [Recent Weight Gain (___ Lbs)] : no recent weight gain [Recent Weight Loss (___ Lbs)] : no recent weight loss [Blurred Vision] : no blurred vision [Dysphagia] : no dysphagia [Neck Pain] : no neck pain [Chest Pain] : no chest pain [Palpitations] : no palpitations [Fast Heart Rate] : heart rate is not fast [Lower Ext Edema] : no lower extremity edema [Shortness Of Breath] : no shortness of breath [Wheezing] : no wheezing [SOB on Exertion] : no shortness of breath on exertion [Nausea] : no nausea [Constipation] : no constipation [Vomiting] : no vomiting [Diarrhea] : no diarrhea [Headaches] : no headaches [Dizziness] : no dizziness [Tremors] : no tremors [Pain/Numbness of Digits] : no pain/numbness of digits [Cold Intolerance] : no cold intolerance [Heat Intolerance] : no heat intolerance

## 2024-09-23 NOTE — ASSESSMENT
[Diabetes Foot Care] : diabetes foot care [Carbohydrate Consistent Diet] : carbohydrate consistent diet [Importance of Diet and Exercise] : importance of diet and exercise to improve glycemic control, achieve weight loss and improve cardiovascular health [Exercise/Effect on Glucose] : exercise/effect on glucose [Hypoglycemia Management] : hypoglycemia management [Self Monitoring of Blood Glucose] : self monitoring of blood glucose [Retinopathy Screening] : Patient was referred to ophthalmology for retinopathy screening [Weight Loss] : weight loss [FreeTextEntry1] : [employer]  Is  a 54 year  old patient  who presented for follow up, found to have positive antibodies for DON and  diagnosed as [place] / type 1 DM with features of type 2 DM   #uncontrolled type I DM/ [place]  with insulin resistance  - A1c now 6.6%%  , was managed as type 2 DM , antibodies positive very low c peptid indicating [place] /type 1 DM  - CGM reviewed  -increase Mounjaro to 7.5 mg Q week , as patient has inuslin resistance too  - continue Semglee 50  units at bedtime and   lower humalog to  10-14+ iss with dinner   only once back on Mounjaro  - continue metformin 1000 mg BID - using FreeStyle sruthi  CGM as will be checking FS 4-5 times/day and adjusting insulin doses ( 4 injections/day )  based on FS  - Continue statin and ACE inhibitors for now , BP ok   - following with ophthalmology - discussed foot care  - b12 ok  # vit d deficiency : OTC 1000 IU daily

## 2025-03-18 RX ORDER — TIRZEPATIDE 7.5 MG/.5ML
7.5 INJECTION, SOLUTION SUBCUTANEOUS
Qty: 3 | Refills: 3 | Status: ACTIVE | COMMUNITY
Start: 2025-03-18 | End: 1900-01-01

## 2025-03-26 ENCOUNTER — NON-APPOINTMENT (OUTPATIENT)
Age: 55
End: 2025-03-26

## 2025-03-26 ENCOUNTER — APPOINTMENT (OUTPATIENT)
Dept: ENDOCRINOLOGY | Facility: CLINIC | Age: 55
End: 2025-03-26
Payer: COMMERCIAL

## 2025-03-26 VITALS
HEART RATE: 124 BPM | WEIGHT: 161 LBS | BODY MASS INDEX: 24.4 KG/M2 | OXYGEN SATURATION: 99 % | SYSTOLIC BLOOD PRESSURE: 130 MMHG | HEIGHT: 68 IN | DIASTOLIC BLOOD PRESSURE: 70 MMHG

## 2025-03-26 DIAGNOSIS — E16.2 HYPOGLYCEMIA, UNSPECIFIED: ICD-10-CM

## 2025-03-26 DIAGNOSIS — E78.5 HYPERLIPIDEMIA, UNSPECIFIED: ICD-10-CM

## 2025-03-26 DIAGNOSIS — E13.9 OTHER SPECIFIED DIABETES MELLITUS W/OUT COMPLICATIONS: ICD-10-CM

## 2025-03-26 DIAGNOSIS — E66.9 OBESITY, UNSPECIFIED: ICD-10-CM

## 2025-03-26 PROCEDURE — 99214 OFFICE O/P EST MOD 30 MIN: CPT

## 2025-03-26 RX ORDER — BLOOD-GLUCOSE SENSOR
EACH MISCELLANEOUS
Qty: 6 | Refills: 3 | Status: ACTIVE | COMMUNITY
Start: 2025-03-26 | End: 1900-01-01

## 2025-06-10 RX ORDER — INSULIN GLARGINE 300 [IU]/ML
300 INJECTION, SOLUTION SUBCUTANEOUS DAILY
Qty: 3 | Refills: 3 | Status: ACTIVE | COMMUNITY
Start: 2025-06-03 | End: 1900-01-01